# Patient Record
Sex: FEMALE | HISPANIC OR LATINO
[De-identification: names, ages, dates, MRNs, and addresses within clinical notes are randomized per-mention and may not be internally consistent; named-entity substitution may affect disease eponyms.]

---

## 2023-05-31 ENCOUNTER — APPOINTMENT (OUTPATIENT)
Dept: GASTROENTEROLOGY | Facility: CLINIC | Age: 68
End: 2023-05-31
Payer: COMMERCIAL

## 2023-05-31 VITALS
SYSTOLIC BLOOD PRESSURE: 192 MMHG | BODY MASS INDEX: 23.19 KG/M2 | TEMPERATURE: 98 F | DIASTOLIC BLOOD PRESSURE: 106 MMHG | HEIGHT: 62 IN | OXYGEN SATURATION: 98 % | HEART RATE: 82 BPM | WEIGHT: 126 LBS

## 2023-05-31 DIAGNOSIS — Z12.11 ENCOUNTER FOR SCREENING FOR MALIGNANT NEOPLASM OF COLON: ICD-10-CM

## 2023-05-31 PROBLEM — Z00.00 ENCOUNTER FOR PREVENTIVE HEALTH EXAMINATION: Status: ACTIVE | Noted: 2023-05-31

## 2023-05-31 PROCEDURE — 99204 OFFICE O/P NEW MOD 45 MIN: CPT

## 2023-05-31 RX ORDER — POLYETHYLENE GLYCOL 3350, SODIUM SULFATE, SODIUM CHLORIDE, POTASSIUM CHLORIDE, ASCORBIC ACID, SODIUM ASCORBATE 140-9-5.2G
140 KIT ORAL
Qty: 1 | Refills: 0 | Status: ACTIVE | COMMUNITY
Start: 2023-05-31 | End: 1900-01-01

## 2023-05-31 NOTE — HISTORY OF PRESENT ILLNESS
[FreeTextEntry1] : 68 year old female presents for screening colonoscopy. \par Last Colonoscopy 12 years ago per patient results were normal. She does not have any medical problems. Bowel movements are daily and regular without difficulty or strain. Denies rectal bleeding, blood in the stool, melena, or hematemesis.

## 2023-05-31 NOTE — PHYSICAL EXAM

## 2023-05-31 NOTE — ASSESSMENT
[FreeTextEntry1] : Attending Attestation \par  Screening Colonoscopy procedure ordered The risks benefits alternatives and complications of the procedure/s were explained to the patient at length. The patient was agreeable and we will proceed. Preparation for procedure discussed reviewed side effects and adverse reactions to prep.\par \par Patient verbalized understanding of all information provided. All questions answered and reviewed. \par I spent 45 minutes with the patient as well as reviewing documents prior to and after the office visit\par \par \par

## 2023-07-24 ENCOUNTER — APPOINTMENT (OUTPATIENT)
Dept: GASTROENTEROLOGY | Facility: AMBULATORY MEDICAL SERVICES | Age: 68
End: 2023-07-24
Payer: COMMERCIAL

## 2023-07-24 ENCOUNTER — APPOINTMENT (OUTPATIENT)
Dept: GASTROENTEROLOGY | Facility: AMBULATORY MEDICAL SERVICES | Age: 68
End: 2023-07-24

## 2023-07-24 PROCEDURE — 45380 COLONOSCOPY AND BIOPSY: CPT | Mod: 33
